# Patient Record
Sex: MALE | Race: WHITE | NOT HISPANIC OR LATINO | Employment: FULL TIME | URBAN - NONMETROPOLITAN AREA
[De-identification: names, ages, dates, MRNs, and addresses within clinical notes are randomized per-mention and may not be internally consistent; named-entity substitution may affect disease eponyms.]

---

## 2023-01-22 ENCOUNTER — HOSPITAL ENCOUNTER (EMERGENCY)
Facility: HOSPITAL | Age: 23
Discharge: HOME | End: 2023-01-22
Attending: STUDENT IN AN ORGANIZED HEALTH CARE EDUCATION/TRAINING PROGRAM | Admitting: STUDENT IN AN ORGANIZED HEALTH CARE EDUCATION/TRAINING PROGRAM
Payer: OTHER MISCELLANEOUS

## 2023-01-22 VITALS
WEIGHT: 315 LBS | BODY MASS INDEX: 45.1 KG/M2 | RESPIRATION RATE: 16 BRPM | TEMPERATURE: 98.3 F | HEIGHT: 70 IN | HEART RATE: 97 BPM | SYSTOLIC BLOOD PRESSURE: 165 MMHG | OXYGEN SATURATION: 97 % | DIASTOLIC BLOOD PRESSURE: 92 MMHG

## 2023-01-22 DIAGNOSIS — J06.9 URI WITH COUGH AND CONGESTION: Primary | ICD-10-CM

## 2023-01-22 LAB
FLUAV RNA RESP QL NAA+PROBE: NOT DETECTED
FLUBV RNA RESP QL NAA+PROBE: NOT DETECTED
SARS-COV-2 RNA RESP QL NAA+PROBE: NOT DETECTED

## 2023-01-22 PROCEDURE — 99283 EMERGENCY DEPT VISIT LOW MDM: CPT

## 2023-01-22 PROCEDURE — 87636 SARSCOV2 & INF A&B AMP PRB: CPT | Performed by: STUDENT IN AN ORGANIZED HEALTH CARE EDUCATION/TRAINING PROGRAM

## 2023-01-22 PROCEDURE — C9803 HOPD COVID-19 SPEC COLLECT: HCPCS

## 2023-01-22 NOTE — Clinical Note
[facility] EMERGENCY DEPARTMENT  [facility] [address]  Phone: [phone]    Reinier Meraz was seen and treated in our emergency department on 1/22/2023.  He may return to work on 01/24/2023.         Thank you for choosing [facility].    Dexter Falk MD

## 2023-01-23 NOTE — ED PROVIDER NOTES
"EMERGENCY DEPARTMENT ATTENDING NOTE    Patient Name: Reinier Meraz    Chief Complaint   Patient presents with   • Exposure To Known Illness   • Letter for School/Work       PATIENT PRESENTATION:  Reinier Meraz is a very pleasant 22 y.o. male with no significant past medical history presents emergency department requesting COVID test for his employer.    Patient states that about 3 days ago he started having some cough nasal congestion and runny nose as well as a mild sore throat and some body aches.  In triage endorses shortness of breath states he does not feel that short of breath denies any significant chest pain any nausea vomit abdominal pain.  No diarrhea.  Does not take any medicines for fever such as Tylenol or Motrin.  No history of asthma or breathing problems.      PHYSICAL EXAM:   VS: /92 (BP Location: Right arm, Patient Position: Sitting)   Pulse 97   Temp 98.3 °F (36.8 °C) (Oral)   Resp 16   Ht 177.8 cm (70\")   Wt (!) 149 kg (328 lb)   SpO2 99%   BMI 47.06 kg/m²   GENERAL: Well-appearing young man sitting up in chair no acute stress; well-nourished, well-developed, awake, alert, no acute distress, nontoxic appearing, comfortable  EYES: PERRL, sclerae anicteric, extraocular movements grossly intact, symmetric lids  EARS, NOSE, MOUTH, THROAT: atraumatic external nose and ears, moist mucous membranes  RESPIRATORY: Unlabored respiratory effort, clear to auscultation bilaterally, good air movement; no inspiratory or expiratory wheezing; no stridor; occasional dry cough during my exam  CARDIOVASCULAR: No murmurs or gallops, peripheral pulses 2+ and equal in all extremities  PSYCHIATRIC: alert, pleasant and cooperative. Appropriate mood and affect.      MEDICAL DECISION MAKING:    Reinier Meraz is a 22 y.o. male presents the emergency department requesting a COVID test for his employer in the setting of cough nasal congestion and mild sore throat.    Differential Diagnosis " Considered: Viral upper respiratory tract infection, COVID, influenza    Labs Ordered:  Labs Reviewed   COVID-19 AND FLU A/B, NP SWAB IN TRANSPORT MEDIA 8-12 HR TAT - Normal    Narrative:     Fact sheet for providers: https://www.fda.gov/media/591302/download    Fact sheet for patients: https://www.fda.gov/media/736283/download    Test performed by PCR.   COVID PRE-OP / PRE-PROCEDURE SCREENING ORDER (NO ISOLATION)    Narrative:     The following orders were created for panel order COVID PRE-OP / PRE-PROCEDURE SCREENING ORDER (NO ISOLATION) - Swab, Nasopharynx.  Procedure                               Abnormality         Status                     ---------                               -----------         ------                     COVID-19 and FLU A/B PCR...[[id number]]  Normal              Final result                 Please view results for these tests on the individual orders.        My lab interpretation: COVID testing negative.    ED Course and Re-evaluation: 23yo M presents emerged part requesting COVID test for his employer in the setting of nasal congestion.  Patient with reassuring vital signs no hypoxia no tachypnea extremely well-appearing afebrile.  COVID and influenza testing sent.  Patient presentation highly consistent with benign viral URI.  I offered the patient an x-ray although I have extremely low suspicion that he this would be due to any bacterial pneumonia in the setting of his constellation of symptoms and patient states that he did not really want an x-ray and would be present if his symptoms worsen which I think is a reasonable plan given my low suspicion.  Patient was discharged home with plan to follow-up with her primary care provider within 2 days for further management return the emergency department immediately if symptoms worsen.      ED Diagnosis:  URI with cough and congestion    Disposition: to home  Follow up plan: PCP follow up within 2 days, return to ED immediately if symptoms  worsen      Signed:  eDxter Falk MD  Emergency Medicine Physician    Please note that portions of this note were completed with a voice recognition program.        Dexter Falk MD  01/22/23 1925

## 2023-01-23 NOTE — DISCHARGE INSTRUCTIONS
Today you were seen for you symptoms which are highly consistent with a viral upper respiratory tract infection.  Your COVID test was negative. I want to follow-up closely with your primary care provider for further management. Since you do not have one please follow-up with a primary care provider listed below.  As we discussed, Mucinex DM is a great medicine for viral upper respiratory tract infections.  If your symptoms worsen I want you to immediately return to the emergency department.    Follow up with one of the [facility] physician groups below to setup primary care. If you have trouble making an appointment, please call the [facility] Nurse Line at [phone]    Dr. Shasha Hector DO, Dr. Azar Grace DO, and ROYCE Cantu  [facility]  [address] [place] [address], [address] [phone]    Dr. Nikita Alicea MD  [facility] Internal Medicine - [address] [place] [address], [address], [place] [address] [phone]    Dr. Karlo Cary DO, Dr. Mich King DO,  ROYCE Seaman, and ROYCE West  [facility] - [address] [place] [address], [address], [place] [address] [phone]     Dr. Mary Cabrera MD, and ROYCE Stone  [facility] Family [address], [place] [address] [phone]    Dr. Vaughn Lopez MD and Dr. Akira Martinez MD  [facility]  [address], [address] [phone]    Dr. Nicolas Allen MD  [facility]  [address] [place] [address], [address], [place], [place], [address] [phone]    Dr. Xavier Chua MD  [facility]  [address] [place], [address] [phone]